# Patient Record
Sex: FEMALE
[De-identification: names, ages, dates, MRNs, and addresses within clinical notes are randomized per-mention and may not be internally consistent; named-entity substitution may affect disease eponyms.]

---

## 2021-10-19 ENCOUNTER — NURSE TRIAGE (OUTPATIENT)
Dept: OTHER | Facility: CLINIC | Age: 86
End: 2021-10-19

## 2021-10-19 NOTE — TELEPHONE ENCOUNTER
Lalo Lee states that she had her Covid booster last week and she is wanting to get a flu vaccine. Asking if she needs to wait to get the flu vaccine    We discussed that the flu and booster vaccine can be given at the same time but she should wait a little if she is having more than mild reaction symptoms. She states only a little localized tenderness at the site.     Reason for Disposition   General information question, no triage required and triager able to answer question    Protocols used: INFORMATION ONLY CALL - NO TRIAGE-ADULT-

## 2022-01-17 ENCOUNTER — NURSE TRIAGE (OUTPATIENT)
Dept: OTHER | Facility: CLINIC | Age: 87
End: 2022-01-17

## 2022-01-17 NOTE — TELEPHONE ENCOUNTER
Subjective: Caller states \"Last Thursday I was throwing up - I have not appetite - I haven't had a bowel movement since then - they told me I have gastritis - I am being seen tomorrow afternoon with my GI speciatlist - but I need to know today what to take for the constipation\"     Current Symptoms: consitpation, no abdominal pain    Onset: 8 days ago; gradual    Associated Symptoms: constipation    Pain Severity: 0/10; N/A; none    Temperature: No fever     What has been tried: Extra fluids    LMP: NA Pregnant: No    Recommended disposition: Speak with your pharmacist concerning mediction to relieve constipation    Care advice provided, patient verbalizes understanding; denies any other questions or concerns; instructed to call back for any new or worsening symptoms. Patient/caller to follow-up with PCP  and speak with her pharmacist    This triage is a result of a call to [address] [place] [address]. Please do not respond to the triage nurse through this encounter. Any subsequent communication should be directly with the patient.       Reason for Disposition   Last bowel movement (BM) > 4 days ago    Protocols used: CONSTIPATION-ADULT-[place]

## 2022-02-11 ENCOUNTER — NURSE TRIAGE (OUTPATIENT)
Dept: OTHER | Facility: CLINIC | Age: 87
End: 2022-02-11

## 2022-02-11 NOTE — TELEPHONE ENCOUNTER
Subjective: Caller states \"Well for the last 3 months I've had different issues. I've had some GI problems. This week I started with a sore throat and a cold. I have asthma. It created an asthma flare. \"     Current Symptoms: weakness-NO energy, asthmatic cough- getting better, NO wheezing, lost about 5lbs in last month    Pulse Ox- 94% normal for patient (70% lung capacity)    Not sure if if Covid exposure, has NOT tested    Onset: 1 week ago; improving    Associated Symptoms: increased sleepiness; reduced activity d/t fatigue    Pain Severity: Denies    Temperature: Denies    What has been tried: Prednisone from previous issues, Albuterol in haler    LMP: NA Pregnant: NA    Recommended disposition: See in Office Today. Patient states she cannot get an appointment with PCP for several days. Writer advised patient to go to [facility] [place] [place]ED or [place] [address]. Writer also offered [facility]. Patient agreeable to [facility]. Care advice provided, patient verbalizes understanding; denies any other questions or concerns; instructed to call back for any new or worsening symptoms. Patient/caller warm transferred to Marysol Olivas at [facility] for appointment scheduling. This triage is a result of a call to [address] [place] [address]. Please do not respond to the triage nurse through this encounter. Any subsequent communication should be directly with the patient.       Reason for Disposition   MODERATE weakness (i.e., interferes with work, school, normal activities) and persists > 3 days    Protocols used: WEAKNESS (GENERALIZED) AND FATIGUE-ADULT-[place]

## 2022-09-07 ENCOUNTER — NURSE TRIAGE (OUTPATIENT)
Dept: OTHER | Facility: CLINIC | Age: 87
End: 2022-09-07

## 2022-09-07 NOTE — TELEPHONE ENCOUNTER
Subjective: Caller states \"I am having some urine and fecal incontinence. I have had it for several years ago, and I saw a urologist for bladder surgery. He wanted to do a hysterectomy I said no, but I just live with it. Last week I started feeling pressure, and was peeing a lot. Went to [facility], was evaluated for UTI which everything is normal. I am still feeling just a pressure in pelvic region. Is unchanged since last evaluated. \"     Did not triage d/t no new worsening symptoms since previous evaluation. Writer recommended following up with PCP to evaluate cause of continued urinary pressure. Caller will f/u with PCP for appointment. This triage is a result of a call to [address] [place] [address]. Please do not respond to the triage nurse through this encounter. Any subsequent communication should be directly with the patient. Reason for Disposition   Caller has already spoken with another triager or PCP AND has further questions AND triager able to answer questions.     Protocols used: No Contact or Duplicate Contact Call-ADULT-

## 2022-12-31 ENCOUNTER — NURSE TRIAGE (OUTPATIENT)
Dept: OTHER | Facility: CLINIC | Age: 87
End: 2022-12-31

## 2022-12-31 NOTE — TELEPHONE ENCOUNTER
Location of patient: [place]     Subjective: Caller states right flank pain. Pt states generalized weakness. Pt is having severe pain that is constant since this morning. Current Symptoms: Right flank pain    Onset: Today  ago;     Associated Symptoms: NA    Pain Severity: 8/10; aching; constant    Temperature: Denies     What has been tried: Tylenol     LMP: NA Pregnant: NA    Recommended disposition: Go to ED Now    Care advice provided, patient verbalizes understanding; denies any other questions or concerns; instructed to call back for any new or worsening symptoms. Patient/caller agrees to proceed to nearest Emergency Department    This triage is a result of a call to [address] [place] [address]. Please do not respond to the triage nurse through this encounter. Any subsequent communication should be directly with the patient.       Reason for Disposition   [1] SEVERE pain (e.g., excruciating, scale 8-10) AND [2] present > 1 hour    Protocols used: Flank Pain-ADULT-

## 2023-01-22 ENCOUNTER — NURSE TRIAGE (OUTPATIENT)
Dept: OTHER | Facility: CLINIC | Age: 88
End: 2023-01-22